# Patient Record
Sex: FEMALE | Race: WHITE | NOT HISPANIC OR LATINO
[De-identification: names, ages, dates, MRNs, and addresses within clinical notes are randomized per-mention and may not be internally consistent; named-entity substitution may affect disease eponyms.]

---

## 2018-12-18 ENCOUNTER — RESULT REVIEW (OUTPATIENT)
Age: 48
End: 2018-12-18

## 2019-06-26 ENCOUNTER — OUTPATIENT (OUTPATIENT)
Dept: OUTPATIENT SERVICES | Facility: HOSPITAL | Age: 49
LOS: 1 days | Discharge: HOME | End: 2019-06-26

## 2019-06-26 ENCOUNTER — APPOINTMENT (OUTPATIENT)
Dept: HEMATOLOGY ONCOLOGY | Facility: CLINIC | Age: 49
End: 2019-06-26
Payer: MEDICAID

## 2019-06-26 ENCOUNTER — RESULT REVIEW (OUTPATIENT)
Age: 49
End: 2019-06-26

## 2019-06-26 VITALS
BODY MASS INDEX: 18.75 KG/M2 | WEIGHT: 105.82 LBS | OXYGEN SATURATION: 100 % | HEIGHT: 62.8 IN | TEMPERATURE: 98.8 F | RESPIRATION RATE: 16 BRPM | SYSTOLIC BLOOD PRESSURE: 117 MMHG | HEART RATE: 80 BPM | DIASTOLIC BLOOD PRESSURE: 80 MMHG

## 2019-06-26 DIAGNOSIS — E61.1 IRON DEFICIENCY: ICD-10-CM

## 2019-06-26 DIAGNOSIS — E53.8 DEFICIENCY OF OTHER SPECIFIED B GROUP VITAMINS: ICD-10-CM

## 2019-06-26 DIAGNOSIS — D64.9 ANEMIA, UNSPECIFIED: ICD-10-CM

## 2019-06-26 DIAGNOSIS — Z86.2 PERSONAL HISTORY OF DISEASES OF THE BLOOD AND BLOOD-FORMING ORGANS AND CERTAIN DISORDERS INVOLVING THE IMMUNE MECHANISM: ICD-10-CM

## 2019-06-26 DIAGNOSIS — Z78.9 OTHER SPECIFIED HEALTH STATUS: ICD-10-CM

## 2019-06-26 LAB
HCT VFR BLD CALC: 28.8 % — LOW (ref 34.5–45)
HGB BLD-MCNC: 8.4 G/DL — LOW (ref 11.5–15.5)
MCHC RBC-ENTMCNC: 18.8 PG — LOW (ref 27–34)
MCHC RBC-ENTMCNC: 29 G/DL — LOW (ref 32–36)
MCV RBC AUTO: 64.7 FL — LOW (ref 80–100)
PLATELET # BLD AUTO: 369 K/UL — SIGNIFICANT CHANGE UP (ref 150–400)
RBC # BLD: 4.45 M/UL — SIGNIFICANT CHANGE UP (ref 3.8–5.2)
RBC # FLD: 17.2 % — HIGH (ref 10.3–14.5)
WBC # BLD: 6.2 K/UL — SIGNIFICANT CHANGE UP (ref 3.8–10.5)
WBC # FLD AUTO: 6.2 K/UL — SIGNIFICANT CHANGE UP (ref 3.8–10.5)

## 2019-06-26 PROCEDURE — 99204 OFFICE O/P NEW MOD 45 MIN: CPT

## 2019-06-27 PROBLEM — D64.9 ANEMIA: Status: ACTIVE | Noted: 2019-06-26

## 2019-06-27 PROBLEM — Z86.2 HISTORY OF ANEMIA: Status: RESOLVED | Noted: 2019-06-27 | Resolved: 2019-06-27

## 2019-06-27 PROBLEM — E61.1 IRON DEFICIENCY: Status: ACTIVE | Noted: 2019-06-27

## 2019-06-27 PROBLEM — E53.8 B12 DEFICIENCY: Status: ACTIVE | Noted: 2019-06-27

## 2019-06-27 PROBLEM — Z78.9 VEGETARIAN: Status: ACTIVE | Noted: 2019-06-27

## 2019-06-27 NOTE — REVIEW OF SYSTEMS
[SOB on Exertion] : shortness of breath during exertion [Negative] : Constitutional [FreeTextEntry6] : On stairs at home.

## 2019-06-27 NOTE — ASSESSMENT
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  Major etiology in this is vegetarian status.  Infromed to the patient that there are no vegitarian or vegan alternatives to B12 and folic acid, however there are plenty of supplements available in different forms.  Informed her that simply because she did not like the preparations shes had so far, some of the others are available and they are over the counter. Recommended simply purchasing the B12 sublingual tablets, gave her a recommendations at [email or web address] and [employer] though she is free to get these form anywhere as the market is plentiful.  Reccomended she also take a ferrous gluconate variety of iron as it is easier to tolerate.\par \par Informed her that Anemia and B12 and folic acid deficiency can lead to significant morbidity and mortality including skin changes, neuropathy, weakness and fatigue, if bad enough could even cause cardiopulmonary arrest from anemia.  \par \par Offered to get her an iron infusion but patient declined.  Can look into if the B12 injection is truly not covered she seems to have no issue getting this administered in the past it was more of a coverage issue.  \par CHeck B12, folic acid and iron for authorization for B12 and iron infusions.  Check anti parietal cell antibody and intrinsic factor antibody for pernicious anemia.  \par \par Spent > 45 minutes in direct patient care and and addressed all questions and concerns.  >50% of this time was in direct face to face contact with patient during exam and counseling.

## 2019-06-27 NOTE — HISTORY OF PRESENT ILLNESS
[de-identified] : This is a 49 year old woman with a history of b12 and iron deficiency anemia.  Was seeing another doctor in [facility] and was diagnosed with this.  Strict vegetarian.   Was previously on B12 supplements however stopped it because the pharmacy was no longer giving her SL dissolvable and she could not tolerate the taste. Similarly could not tolerate the iron tablets and had stopped them as well.  B12 noted to be 150 5/7 and Ferritin 2 Hg 7.9g/dl.  Patient feels weak and tired and SOB on exertion.

## 2019-06-27 NOTE — PHYSICAL EXAM
[Thin] : thin [Normal] : normoactive bowel sounds, soft and nontender, no hepatosplenomegaly or masses appreciated [de-identified] : Grade I alopecia diffuse thinning.

## 2019-07-03 LAB
FERRITIN SERPL-MCNC: 2 NG/ML
FOLATE SERPL-MCNC: >20 NG/ML
IF BLOCK AB SER QL: NORMAL
IRON SATN MFR SERPL: 3 %
IRON SERPL-MCNC: 13 UG/DL
METHYLMALONATE SERPL-SCNC: 923 NMOL/L
PCA AB SER QL IF: NORMAL
TIBC SERPL-MCNC: 496 UG/DL
UIBC SERPL-MCNC: 483 UG/DL
VIT B12 SERPL-MCNC: <150 PG/ML

## 2019-09-24 ENCOUNTER — APPOINTMENT (OUTPATIENT)
Dept: HEMATOLOGY ONCOLOGY | Facility: CLINIC | Age: 49
End: 2019-09-24

## 2019-09-26 ENCOUNTER — OUTPATIENT (OUTPATIENT)
Dept: OUTPATIENT SERVICES | Facility: HOSPITAL | Age: 49
LOS: 1 days | Discharge: HOME | End: 2019-09-26

## 2019-09-26 DIAGNOSIS — D64.9 ANEMIA, UNSPECIFIED: ICD-10-CM

## 2019-09-30 ENCOUNTER — APPOINTMENT (OUTPATIENT)
Dept: HEMATOLOGY ONCOLOGY | Facility: CLINIC | Age: 49
End: 2019-09-30
Payer: MEDICAID

## 2019-09-30 ENCOUNTER — RESULT REVIEW (OUTPATIENT)
Age: 49
End: 2019-09-30

## 2019-09-30 VITALS
RESPIRATION RATE: 17 BRPM | OXYGEN SATURATION: 100 % | BODY MASS INDEX: 18.55 KG/M2 | DIASTOLIC BLOOD PRESSURE: 80 MMHG | SYSTOLIC BLOOD PRESSURE: 120 MMHG | TEMPERATURE: 98.5 F | WEIGHT: 104.06 LBS | HEART RATE: 81 BPM

## 2019-09-30 LAB
BASOPHILS # BLD AUTO: 0 K/UL — SIGNIFICANT CHANGE UP (ref 0–0.2)
BASOPHILS NFR BLD AUTO: 0.8 % — SIGNIFICANT CHANGE UP (ref 0–2)
EOSINOPHIL # BLD AUTO: 0 K/UL — SIGNIFICANT CHANGE UP (ref 0–0.5)
EOSINOPHIL NFR BLD AUTO: 0.5 % — SIGNIFICANT CHANGE UP (ref 0–6)
HCT VFR BLD CALC: 37.4 % — SIGNIFICANT CHANGE UP (ref 34.5–45)
HGB BLD-MCNC: 11.8 G/DL — SIGNIFICANT CHANGE UP (ref 11.5–15.5)
LYMPHOCYTES # BLD AUTO: 1.2 K/UL — SIGNIFICANT CHANGE UP (ref 1–3.3)
LYMPHOCYTES # BLD AUTO: 28.1 % — SIGNIFICANT CHANGE UP (ref 13–44)
MCHC RBC-ENTMCNC: 26 PG — LOW (ref 27–34)
MCHC RBC-ENTMCNC: 31.6 G/DL — LOW (ref 32–36)
MCV RBC AUTO: 82.3 FL — SIGNIFICANT CHANGE UP (ref 80–100)
MONOCYTES # BLD AUTO: 0.4 K/UL — SIGNIFICANT CHANGE UP (ref 0–0.9)
MONOCYTES NFR BLD AUTO: 9.2 % — SIGNIFICANT CHANGE UP (ref 2–14)
NEUTROPHILS # BLD AUTO: 2.6 K/UL — SIGNIFICANT CHANGE UP (ref 1.8–7.4)
NEUTROPHILS NFR BLD AUTO: 61.3 % — SIGNIFICANT CHANGE UP (ref 43–77)
PLATELET # BLD AUTO: 270 K/UL — SIGNIFICANT CHANGE UP (ref 150–400)
RBC # BLD: 4.55 M/UL — SIGNIFICANT CHANGE UP (ref 3.8–5.2)
RBC # FLD: 19.5 % — HIGH (ref 10.3–14.5)
WBC # BLD: 4.3 K/UL — SIGNIFICANT CHANGE UP (ref 3.8–10.5)
WBC # FLD AUTO: 4.3 K/UL — SIGNIFICANT CHANGE UP (ref 3.8–10.5)

## 2019-09-30 PROCEDURE — 99213 OFFICE O/P EST LOW 20 MIN: CPT

## 2019-10-02 RX ORDER — CYANOCOBALAMIN 1000 UG/ML
1000 INJECTION INTRAMUSCULAR; SUBCUTANEOUS
Qty: 1 | Refills: 0 | Status: DISCONTINUED | COMMUNITY
Start: 2019-09-30 | End: 2019-10-02

## 2019-10-02 NOTE — ASSESSMENT
[FreeTextEntry1] : This is a 49 year old woman with history of anemia clearly from B12 and folic acid deficiency.  Major etiology in this is vegetarian status.  After taking the iron and B12 supplements  for a month, Hg normalized to 11.8g/dl. Informed her that this now excludes the anemia as the source of her fatigue. She should be asymptomatic with a Hg > 11g/dl.  Recommend she try to get a few nights of continuous sleep, asked her to try melatonin 5mg QHS if not helpful increase to 2 tablets at night.

## 2019-10-02 NOTE — HISTORY OF PRESENT ILLNESS
[de-identified] : Since the last visit, patient has been taking the the ferrous gluconate supplement (28mg) daily, some GI problems but less than the ferrous sulfate.   Also taking the B12 vitamins. Hg up > 11.8 g/dl today, normal value for our lab. Patient still complains of severe fatigue and weakness.  She does notice poor quality sleep.  Patient sleeps about 6 hours a day.  Wakes up often throughout the night.  \par [employer].  \par [address] \[place] [address] \par [phone]

## 2019-10-02 NOTE — HISTORY OF PRESENT ILLNESS
[de-identified] : Since the last visit, patient has been taking the the ferrous gluconate supplement (28mg) daily, some GI problems but less than the ferrous sulfate.   Also taking the B12 vitamins. Hg up > 11.8 g/dl today, normal value for our lab. Patient still complains of severe fatigue and weakness.  She does notice poor quality sleep.  Patient sleeps about 6 hours a day.  Wakes up often throughout the night.  \par [employer].  \par [address] \[place] [address] \par [phone]